# Patient Record
Sex: FEMALE | Race: WHITE | NOT HISPANIC OR LATINO | URBAN - METROPOLITAN AREA
[De-identification: names, ages, dates, MRNs, and addresses within clinical notes are randomized per-mention and may not be internally consistent; named-entity substitution may affect disease eponyms.]

---

## 2017-03-14 ENCOUNTER — OFFICE (OUTPATIENT)
Dept: URBAN - METROPOLITAN AREA CLINIC 101 | Facility: CLINIC | Age: 67
End: 2017-03-14
Payer: MEDICARE

## 2017-03-14 VITALS
DIASTOLIC BLOOD PRESSURE: 65 MMHG | HEART RATE: 78 BPM | HEIGHT: 68 IN | WEIGHT: 187 LBS | TEMPERATURE: 97.7 F | SYSTOLIC BLOOD PRESSURE: 129 MMHG

## 2017-03-14 DIAGNOSIS — R11.0 NAUSEA: ICD-10-CM

## 2017-03-14 DIAGNOSIS — Z80.0 FAMILY HISTORY OF MALIGNANT NEOPLASM OF DIGESTIVE ORGANS: ICD-10-CM

## 2017-03-14 DIAGNOSIS — Z86.010 PERSONAL HISTORY OF COLONIC POLYPS: ICD-10-CM

## 2017-03-14 DIAGNOSIS — R10.10 UPPER ABDOMINAL PAIN, UNSPECIFIED: ICD-10-CM

## 2017-03-14 PROCEDURE — 99244 OFF/OP CNSLTJ NEW/EST MOD 40: CPT

## 2017-03-14 NOTE — SERVICEHPINOTES
TARIK CELIS   is a   67   year old female who is being seen in consultation at the request of   FALLON DENSON   for upper abdominal pain.  S/P cholecystectomy and partial hysterctomy. Reports upper abdominal pain for a few weeks now. Last week the pain was very severe.  Experiences nausea when the pain is severe. Denies emesis. Denies fever. Takes ASA 81mg daily.  She feels very bloated. She has GERD on occasion takes Tums prn with relief. Has taken Nexium in the past. Salads or onions cause heartburn. Denies melena. She has a BM daily to every other day. Stools are Lindsey stool scale type 3-5. No blood present. Denies weight loss. She has a history of polyps. Her sister had a perforation due to diverticulitis and is s/p surgery.  Her father had colon cancer.  Her last colonoscopy was 6/29/2012-no polyps, diverticulosis, tortuous colon-repeat in 5 years.

## 2017-05-16 ENCOUNTER — ON CAMPUS - OUTPATIENT (OUTPATIENT)
Dept: URBAN - METROPOLITAN AREA HOSPITAL 35 | Facility: HOSPITAL | Age: 67
End: 2017-05-16

## 2017-05-16 DIAGNOSIS — K21.9 GASTRO-ESOPHAGEAL REFLUX DISEASE WITHOUT ESOPHAGITIS: ICD-10-CM

## 2017-05-16 DIAGNOSIS — R10.2 PELVIC AND PERINEAL PAIN: ICD-10-CM

## 2017-05-16 PROCEDURE — 43239 EGD BIOPSY SINGLE/MULTIPLE: CPT

## 2017-08-21 ENCOUNTER — OFFICE (OUTPATIENT)
Dept: URBAN - METROPOLITAN AREA CLINIC 101 | Facility: CLINIC | Age: 67
End: 2017-08-21

## 2017-08-21 PROCEDURE — 00031: CPT

## 2017-08-31 ENCOUNTER — ON CAMPUS - OUTPATIENT (OUTPATIENT)
Dept: URBAN - METROPOLITAN AREA HOSPITAL 35 | Facility: HOSPITAL | Age: 67
End: 2017-08-31
Payer: COMMERCIAL

## 2017-08-31 DIAGNOSIS — D12.3 BENIGN NEOPLASM OF TRANSVERSE COLON: ICD-10-CM

## 2017-08-31 DIAGNOSIS — Z80.0 FAMILY HISTORY OF MALIGNANT NEOPLASM OF DIGESTIVE ORGANS: ICD-10-CM

## 2017-08-31 PROCEDURE — 45385 COLONOSCOPY W/LESION REMOVAL: CPT

## 2023-01-27 ENCOUNTER — OFFICE (OUTPATIENT)
Dept: URBAN - METROPOLITAN AREA CLINIC 102 | Facility: CLINIC | Age: 73
End: 2023-01-27
Payer: MEDICARE

## 2023-01-27 VITALS
HEART RATE: 65 BPM | HEIGHT: 66 IN | DIASTOLIC BLOOD PRESSURE: 79 MMHG | TEMPERATURE: 98.1 F | SYSTOLIC BLOOD PRESSURE: 152 MMHG | WEIGHT: 190 LBS

## 2023-01-27 DIAGNOSIS — I10 ESSENTIAL (PRIMARY) HYPERTENSION: ICD-10-CM

## 2023-01-27 DIAGNOSIS — K21.9 GASTRO-ESOPHAGEAL REFLUX DISEASE WITHOUT ESOPHAGITIS: ICD-10-CM

## 2023-01-27 DIAGNOSIS — Z79.82 LONG TERM (CURRENT) USE OF ASPIRIN: ICD-10-CM

## 2023-01-27 DIAGNOSIS — R00.2 PALPITATIONS: ICD-10-CM

## 2023-01-27 DIAGNOSIS — K59.00 CONSTIPATION, UNSPECIFIED: ICD-10-CM

## 2023-01-27 DIAGNOSIS — Z80.0 FAMILY HISTORY OF MALIGNANT NEOPLASM OF DIGESTIVE ORGANS: ICD-10-CM

## 2023-01-27 DIAGNOSIS — Z86.010 PERSONAL HISTORY OF COLONIC POLYPS: ICD-10-CM

## 2023-01-27 PROCEDURE — 99204 OFFICE O/P NEW MOD 45 MIN: CPT | Performed by: PHYSICIAN ASSISTANT

## 2023-01-27 RX ORDER — POLYETHYLENE GLYCOL 3350 17 G/17G
POWDER, FOR SOLUTION ORAL
Qty: 1 | Refills: 0 | Status: ACTIVE
Start: 2023-01-27

## 2023-01-27 NOTE — SERVICEHPINOTES
Ms. Florian is a 72 YoF with history of colon polyps, GERD, HTN, HLD, and arthritis who presents to the office for surveillance colonoscopy, also with concern of some constipation. +Straining. Noted she has history of fluctuating bowel habits. Currently BMs most days, BSS 2-3, she feels she has trouble emptying. No bleeding or melena. She notes an occasional "pinch" or dull ache in her right side/right flank.  She has history of GERD for which she continues taking lansoprazole with good relief of symptoms. No dysphagia, N/V. She is taking magnesium supplement. No recent fiber supplement or laxatives. She previously took a probiotic without improvement in bowel habits. She reports she had some abnormal imaging of the kidneys/urinary tract and plans to follow up with nephrology in a few weeks.
br br+Family hx of father with colon cancer (80s), sister with IBD. 
br She denies any new cardiac/pulmonary conditions. No LUIS ENRIQUE or strokes. She had history of palpitations for which she was evaluated by cardiology (reportedly negative). She takes aspirin 81 mg daily for primary prevention for many years. No other AC or blood thinning medications. Hx abdominal surgeries: Hysterectomy, CCY.

## 2023-01-27 NOTE — SERVICENOTES
I personally discussed the procedure with the patient, including the risks, benefits, and alternatives of colonoscopy. All questions answered. 

I have reviewed the history, physical exam, assessment and management plans.  I concur with or have edited all elements of her note.

## 2023-04-19 ENCOUNTER — ON CAMPUS - OUTPATIENT (OUTPATIENT)
Dept: URBAN - METROPOLITAN AREA HOSPITAL 65 | Facility: HOSPITAL | Age: 73
End: 2023-04-19
Payer: COMMERCIAL

## 2023-04-19 DIAGNOSIS — Z86.010 PERSONAL HISTORY OF COLONIC POLYPS: ICD-10-CM

## 2023-04-19 DIAGNOSIS — D12.3 BENIGN NEOPLASM OF TRANSVERSE COLON: ICD-10-CM

## 2023-04-19 PROCEDURE — 45385 COLONOSCOPY W/LESION REMOVAL: CPT | Performed by: INTERNAL MEDICINE

## 2025-06-24 ENCOUNTER — OFFICE (OUTPATIENT)
Dept: URBAN - METROPOLITAN AREA CLINIC 102 | Facility: CLINIC | Age: 75
End: 2025-06-24
Payer: COMMERCIAL

## 2025-06-24 VITALS
HEART RATE: 66 BPM | SYSTOLIC BLOOD PRESSURE: 139 MMHG | WEIGHT: 185 LBS | HEIGHT: 66 IN | TEMPERATURE: 98.2 F | DIASTOLIC BLOOD PRESSURE: 76 MMHG

## 2025-06-24 DIAGNOSIS — Z80.0 FAMILY HISTORY OF MALIGNANT NEOPLASM OF DIGESTIVE ORGANS: ICD-10-CM

## 2025-06-24 DIAGNOSIS — R63.4 ABNORMAL WEIGHT LOSS: ICD-10-CM

## 2025-06-24 DIAGNOSIS — R93.3 ABNORMAL FINDINGS ON DIAGNOSTIC IMAGING OF OTHER PARTS OF DI: ICD-10-CM

## 2025-06-24 DIAGNOSIS — R19.7 DIARRHEA, UNSPECIFIED: ICD-10-CM

## 2025-06-24 DIAGNOSIS — R10.31 RIGHT LOWER QUADRANT PAIN: ICD-10-CM

## 2025-06-24 DIAGNOSIS — Z86.0100 PERSONAL HISTORY OF COLON POLYPS, UNSPECIFIED: ICD-10-CM

## 2025-06-24 DIAGNOSIS — E87.6 HYPOKALEMIA: ICD-10-CM

## 2025-06-24 DIAGNOSIS — A49.8 OTHER BACTERIAL INFECTIONS OF UNSPECIFIED SITE: ICD-10-CM

## 2025-06-24 DIAGNOSIS — R10.32 LEFT LOWER QUADRANT PAIN: ICD-10-CM

## 2025-06-24 DIAGNOSIS — Z83.79 FAMILY HISTORY OF OTHER DISEASES OF THE DIGESTIVE SYSTEM: ICD-10-CM

## 2025-06-24 DIAGNOSIS — K52.9 NONINFECTIVE GASTROENTERITIS AND COLITIS, UNSPECIFIED: ICD-10-CM

## 2025-06-24 DIAGNOSIS — R11.0 NAUSEA: ICD-10-CM

## 2025-06-24 PROCEDURE — 99215 OFFICE O/P EST HI 40 MIN: CPT

## 2025-06-24 NOTE — SERVICEHPINOTES
TARIK CELIS   is a   75  female who presents for follow up after she was at [facility] on 6/17/2025, due to LLQ and RLQ pain. Describes the pain as ache that is intermittent, at the time of having BM, it is more painful and couple minutes later the pain gets better. She also reports diarrhea that is ongoing for the same period of time. Nausea subsided. She has Zofran given by ER team at home to take as needed. She lost 6 lbs in the last one month. br
br   The LLQ and RLQ is ongoing for one month, her PCP advised her verbally that this might be diverticulitis and no images done at this time and placed her on two different ABx and the patient return to her PCP office 6 days later still being symptomatic and advised her to stop the two ABx and replaced her ABx. Her sx continued to have sx and on the 6/17/2025 she went to the ER, had stool tests, + C diff noted, and placed on vancomycin and advised to meet with the GI team. She also had CT scan with contrast and noted: moderate diffuse colon wall thickening consistent with diffuse colitis. No bowel obstruction. No focal diverticulitis, sigmoid diverticulosis. 
br
brX3-4/day on BSS type 6 at times she sees type 3. 
br
brThe patient denies blood in stool (mixed or on wiping), mucus in stool, constipation,  fever, chills, night sweats, or recent travel. The patient denies epigastric pain, dysphagia, vomiting, early satiety, postprandial fullness, melena, loss of appetite. The patient meets Dr. Jimenez twice a year due to "carotid artery occluded and palpitation" The patient is not on anticoagulants. Takes NSAIDs once a day.  There is  family history of colorectal cancer, "father" and patient sister with Crohn's disease. The patient normally gets colonoscopy, most recent colonoscopy was on 4/19/2023 - All polyps are benign. One was adenoma type. Repeat colonoscopy in 5 years.br br
br

## 2025-07-31 ENCOUNTER — AMBULATORY SURGICAL CENTER (OUTPATIENT)
Dept: URBAN - METROPOLITAN AREA SURGERY 23 | Facility: SURGERY | Age: 75
End: 2025-07-31
Payer: COMMERCIAL

## 2025-07-31 DIAGNOSIS — Z80.0 FAMILY HISTORY OF MALIGNANT NEOPLASM OF DIGESTIVE ORGANS: ICD-10-CM

## 2025-07-31 DIAGNOSIS — Q43.8 OTHER SPECIFIED CONGENITAL MALFORMATIONS OF INTESTINE: ICD-10-CM

## 2025-07-31 DIAGNOSIS — K64.9 UNSPECIFIED HEMORRHOIDS: ICD-10-CM

## 2025-07-31 DIAGNOSIS — Z86.0101 PERSONAL HISTORY OF ADENOMATOUS AND SERRATED COLON POLYPS: ICD-10-CM

## 2025-07-31 DIAGNOSIS — Z09 ENCOUNTER FOR FOLLOW-UP EXAMINATION AFTER COMPLETED TREATMEN: ICD-10-CM

## 2025-07-31 DIAGNOSIS — K57.30 DIVERTICULOSIS OF LARGE INTESTINE WITHOUT PERFORATION OR ABS: ICD-10-CM

## 2025-07-31 PROCEDURE — G0105 COLORECTAL SCRN; HI RISK IND: HCPCS | Performed by: INTERNAL MEDICINE
